# Patient Record
(demographics unavailable — no encounter records)

---

## 2025-03-27 NOTE — RAD REPORT
Procedure: Chest Single View



HISTORY: Cough                    



COMPARISON: 2021



FINDINGS:



The lungs appear clear of acute infiltrate.



No significant pleural effusion noted.



The heart is moderately enlarged.



.



IMPRESSION:



No acute abnormality is displayed.



Reported By: Quentin Carvajal 

Electronically Signed:  3/27/2025 10:19 PM

## 2025-03-27 NOTE — P.HP
Certification for Inpatient


Patient admitted to: Inpatient


With expected LOS: >2 Midnights


Practitioner: I am a practitioner with admitting privileges, knowledge of 

patient current condition, hospital course, and medical plan of care.


Services: Services provided to patient in accordance with Admission requirements

found in Title 42 Section 412.3 of the Code of Federal Regulations





Patient History


Date of Service: 03/27/25


Reason for admission: Fever


History of Present Illness: 





Patient is a 73-year-old  female with a past medical history of 

obesity, hypertension, type 2 diabetes mellitus and hyperlipidemia.  She is 

being admitted to the hospital after she presented with fever.  Patient had a 

fever of 100.4 degrees at home.  She is also experiencing generalized weakness, 

confusion manifested by poor concentration.  Workup in the ER revealed WBC of 

14,000 renal insufficiency and abnormal urinalysis.


Allergies





Penicillins Allergy (Unverified 03/28/18 16:42)


   Unknown








Physical Examination





- Physical Exam


General: Acute distress, Obese


HEENT: Atraumatic, Normocephalic


Cardiovascular: No edema, Normal pulses, Regular rate/rhythm, Normal S1 S2


Gastrointestinal: Soft and benign, Non-distended


Neurological: Normal speech





- Studies


Laboratory Data (last 24 hrs)











  03/27/25 03/27/25 03/27/25





  20:40 20:40 20:40


 


WBC   14.60 H 


 


Hgb   13.2 


 


Hct   37.6 


 


Plt Count   157 


 


PT  14.4 H  


 


INR  1.28  


 


Sodium    137


 


Potassium    2.8 L


 


BUN    19 H


 


Creatinine    1.29 H


 


Glucose    229 H


 


Magnesium    0.7 L*


 


Total Bilirubin    1.5 H


 


AST    22


 


ALT    26


 


Alkaline Phosphatase    35 L











Assessment and Plan





- Problems (Diagnosis)


(1) UTI (urinary tract infection)


Current Visit: Yes   Status: Acute   





(2) Hypertension


Current Visit: Yes   Status: Acute   





(3) Type 2 diabetes mellitus


Current Visit: Yes   Status: Acute   





(4) Hyperlipidemia


Current Visit: Yes   Status: Acute   





- Plan


Assessment


This is a 73-year-old  female who is being admitted after she presented

with fever, generalized weakness and malaise.  She has also been experiencing 

altered mental status manifested by confusion and poor concentration.  During my

evaluation in the ER, patient's mental status was back to baseline.  She is 

receiving ceftriaxone for possible UTI.





UTI


Hypomagnesemia


Hypokalemia


Acute kidney injury


Hypertension


Hyperlipidemia


Type 2 diabetes mellitus





Plan:


Will admit inpatient telemetry


Continue patient on levofloxacin due to penicillin allergy


Follow urine and blood cultures


Electrolyte repletion including potassium and magnesium


Antiemetics


Resume rest of home medications upon reconciliation





- Advance Directives


Does patient have a Living Will: No


Does patient have a Durable POA for Healthcare: No

## 2025-03-27 NOTE — EDPHYS
Physician Documentation                                                                           

 [facility]                                                                 

Name: Marie Rodríguez                                                                                

Age: 73 yrs                                                                                       

Sex: Female                                                                                       

: 1951                                                                                   

MRN: [id number]                                                                                   

Arrival Date: 2025                                                                          

Time: 19:51                                                                                       

Account#: [id number]                                                                            

Bed 19                                                                                            

Private MD:                                                                                       

ED Physician Saad Lozada                                                                      

HPI:                                                                                              

                                                                                             

20:46 This 73 yrs old  Female presents to ER via EMS with complaints of fever, fall, will 

      weak.                                                                                       

20:46 weak fever. The patient presents with confusion, trouble concentrating. Onset: The      will 

      symptoms/episode began/occurred 1 day(s) ago. Possible causes: CVA or TIA, head injury,     

      low blood sugar, sepsis, the patient has had a history of a fever. Associated signs and     

      symptoms: The patient has no apparent associated signs or symptoms. Current symptoms:       

      In the emergency department the patient's symptoms are unchanged from the initial           

      presentation, despite home interventions. The patient reports fever, that was measured      

      at 100.4 degrees Fahrenheit. Modifying factors: there are no obvious modifying factors.     

      Patient's baseline: Neuro: alert and fully oriented.                                        

                                                                                                  

Historical:                                                                                       

- Allergies:                                                                                      

19:57 PENICILLINS;                                                                            bm8 

- Home Meds:                                                                                      

19:57 Unable to obtain [Active];                                                              bm8 

- PMHx:                                                                                           

19:57 Diabetes - IDDM; Hypertension; early stage dementia (Hypertension);                     bm8 

- PSHx:                                                                                           

19:57 Unable to Obtain;                                                                       bm8 

                                                                                                  

- Immunization history:: Adult Immunizations up to date.                                          

- Infectious Disease History:: Denies.                                                            

- Social history:: Smoking status: Patient denies any tobacco usage or history of.                

  Patient/guardian denies using alcohol, street drugs.                                            

- Family history:: not pertinent.                                                                 

                                                                                                  

                                                                                                  

ROS:                                                                                              

20:46 Eyes: Negative for injury, pain, redness, and discharge, ENT: Negative for injury,      will 

      pain, and discharge, Neck: Negative for injury, pain, and swelling, Cardiovascular:         

      Negative for chest pain, palpitations, and edema, Respiratory: Negative for shortness       

      of breath, cough, wheezing, and pleuritic chest pain, Abdomen/GI: Negative for              

      abdominal pain, nausea, vomiting, diarrhea, and constipation, Back: Negative for injury     

      and pain, : Negative for injury, bleeding, discharge, and swelling, MS/Extremity:         

      Negative for injury and deformity, Skin: Negative for injury, rash, and discoloration,      

      Neuro: Negative for headache, weakness, numbness, tingling, and seizure,                    

20:46 Constitutional: Positive for body aches, chills, fatigue, fever,                            

                                                                                                  

Exam:                                                                                             

20:46 Constitutional:  This is a well developed, well nourished patient who is awake, alert,  will 

      and in no acute distress. Head/Face:  Normocephalic, atraumatic. Eyes:  Pupils equal        

      round and reactive to light, extra-ocular motions intact.  Lids and lashes normal.          

      Conjunctiva and sclera are non-icteric and not injected.  Cornea within normal limits.      

      Periorbital areas with no swelling, redness, or edema. ENT:  Nares patent. No nasal         

      discharge, no septal abnormalities noted.  Tympanic membranes are normal and external       

      auditory canals are clear.  Oropharynx with no redness, swelling, or masses, exudates,      

      or evidence of obstruction, uvula midline.  Mucous membranes moist. Neck:  Trachea          

      midline, no thyromegaly or masses palpated, and no cervical lymphadenopathy.  Supple,       

      full range of motion without nuchal rigidity, or vertebral point tenderness.  No            

      Meningismus. Chest/axilla:  Normal chest wall appearance and motion.  Nontender with no     

      deformity.  No lesions are appreciated. Cardiovascular:  Regular rate and rhythm with a     

      normal S1 and S2.  No gallops, murmurs, or rubs.  Normal PMI, no JVD.  No pulse             

      deficits. Respiratory:  Lungs have equal breath sounds bilaterally, clear to                

      auscultation and percussion.  No rales, rhonchi or wheezes noted.  No increased work of     

      breathing, no retractions or nasal flaring. Abdomen/GI:  Soft, non-tender, with normal      

      bowel sounds.  No distension or tympany.  No guarding or rebound.  No evidence of           

      tenderness throughout. Back:  No spinal tenderness.  No costovertebral tenderness.          

      Full range of motion. Skin:  Warm, dry with normal turgor.  Normal color with no            

      rashes, no lesions, and no evidence of cellulitis. MS/ Extremity:  Pulses equal, no         

      cyanosis.  Neurovascular intact.  Full, normal range of motion., bilateral aka Neuro:       

      Awake and alert, GCS 15, oriented to person, place, time, and situation.  Cranial           

      nerves II-XII grossly intact.  Motor strength 5/5 in all extremities.  Sensory grossly      

      intact.  Cerebellar exam normal.  Normal gait. Psych:  Awake, alert, with orientation       

      to person, place and time.  Behavior, mood, and affect are within normal limits.            

20:56 ECG was reviewed by the Attending Physician.                                            [facility] 

                                                                                                  

Vital Signs:                                                                                      

19:56  / 88; Pulse 99; Resp 18; Temp 100.4; Pulse Ox 97% ; Weight 86 kg; Height 5 ft. 2 bm8 

      in. ; Pain 0/10;                                                                            

22:32  / 81; Pulse 80; Resp 18; Temp 98.7; Pulse Ox 98% ; Pain 0/10;                    bm8 

23:17  / 85; Pulse 74; Resp 18; Temp 98.7; Pulse Ox 95% ; Pain 0/10;                    bm8 

19:56 Body Mass Index 34.68 (86.00 kg, 157.48 cm)                                             bm8 

19:56 Pain Scale: Adult                                                                       bm8 

22:32 Pain Scale: Adult                                                                       bm8 

23:17 Pain Scale: Adult                                                                       bm8 

                                                                                                  

[place] Coma Score:                                                                               

19:59 Eye Response: spontaneous(4). Motor Response: obeys commands(6). Verbal Response:       bm8 

      oriented(5). Total: 15.                                                                     

22:32 Eye Response: spontaneous(4). Motor Response: obeys commands(6). Verbal Response:       bm8 

      oriented(5). Total: 15.                                                                     

23:17 Eye Response: spontaneous(4). Motor Response: obeys commands(6). Verbal Response:       bm8 

      oriented(5). Total: 15.                                                                     

                                                                                                  

MDM:                                                                                              

20:19 Medical Screening Exam initiated                                                        will 

20:53 Differential diagnosis: viral Infection, bacterial infection, URI, bronchitis,          will 

      pneumonia UTI, gastroenteritis. Differential Diagnosis altered mental status, sepsis,       

      flu. Differential Diagnosis: CVA, electrolyte abnormality, hypoglycemia, intracranial       

      bleed, meningitis, pneumonia, seizure, sepsis, TIA, UTI, volume depletion. Data             

      reviewed: vital signs, nurses notes, EMS record, lab test result(s), EKG, radiologic        

      studies, CT scan, plain films. Consideration of Admission/Observation Patient was           

      admitted/placed on observation. Escalation of care including admission/observation          

      considered. I considered the following discharge prescriptions or medication management     

      in the emergency department Medications were administered in the Emergency Department.      

      See MAR. Independent interpretation of the following test(s) in the Emergency               

      Department EKG: See my EKG interpretation above. Test considered but Not performed:         

      Ultrasound no abd usg.                                                                      

                                                                                                  

                                                                                             

20:25 Order name: Basic Metabolic Panel; Complete Time: 21:49                                 [facility] 

                                                                                             

20:25 Order name: CBC with Diff; Complete Time: 21:42                                         [facility] 

                                                                                             

20:25 Order name: LFT's; Complete Time: 21:49                                                 [facility] 

                                                                                             

20:25 Order name: Magnesium; Complete Time: 21:49                                             [facility] 

                                                                                             

20:25 Order name: NT PRO-BNP; Complete Time: 21:49                                            [facility] 

                                                                                             

20:25 Order name: PT-INR; Complete Time: 21:42                                                [facility] 

                                                                                             

20:25 Order name: Troponin HS; Complete Time: 21:49                                           [facility] 

                                                                                             

20:25 Order name: Blood Culture Adult (2)                                                     [facility] 

                                                                                             

20:25 Order name: COVID-19 Ag + Flu A+B Ag; Complete Time: 21:49                              [facility] 

                                                                                             

20:25 Order name: Lactate w/ 2H reflex if indic.; Complete Time: 21:44                        [facility] 

                                                                                             

20:25 Order name: Urinalysis w/ reflexes; Complete Time: 21:42                                [facility] 

                                                                                             

20:25 Order name: Urine Culture                                                               [facility] 

                                                                                             

21:46 Order name: Ghost Lactate-NO COLLECT Timer; Complete Time: 00:35                        [facility]

                                                                                             

22:00 Order name: Creatine Phosphokinase                                                      [facility]

                                                                                             

22:00 Order name: Lactate w/ 2H reflex if indic.                                              [facility]

                                                                                             

22:00 Order name: Magnesium                                                                   [facility]

                                                                                             

22:00 Order name: NT PRO-BNP                                                                  [facility]

                                                                                             

22:00 Order name: Phosphorus                                                                  [facility]

                                                                                             

22:00 Order name: Urinalysis w/ reflexes                                                      [facility]

                                                                                             

22:00 Order name: Basic Metabolic Panel                                                       [facility]

                                                                                             

22:00 Order name: Basic Metabolic Panel                                                       [facility]

                                                                                             

22:00 Order name: CBC with Automated Diff                                                     [facility]

                                                                                             

22:00 Order name: CBC with Automated Diff                                                     [facility]

                                                                                             

06:06 Order name: Phosphorus                                                                  [facility]

                                                                                             

06:06 Order name: Creatine Phosphokinase                                                      [facility]

                                                                                             

06:06 Order name: NT PRO-BNP                                                                  [facility]

                                                                                             

06:06 Order name: Magnesium                                                                   [facility]

                                                                                             

06:44 Order name: Lactate w/ 2H reflex if indic.                                              [facility]

                                                                                             

08:44 Order name: Ghost Lactate-NO COLLECT Timer                                              [facility]

                                                                                             

10:28 Order name: Lactate Sepsis 2 HR Follow-up                                               [facility]

                                                                                             

20:25 Order name: XRAY Chest (1 view); Complete Time: 00:35                                   [facility] 

                                                                                             

20:57 Order name: Head C Spine Mpr Wo Con; Complete Time: 00:35                               [facility]

                                                                                             

21:04 Order name: Chest Abd Pelvis Wo Con; Complete Time: 00:35                               [facility]

                                                                                             

20:25 Order name: EKG; Complete Time: 20:26                                                   [facility] 

                                                                                             

22:02 Order name: Occupational Therapy Consult                                                [facility]

                                                                                             

22:02 Order name: Physical Therapy Consult                                                    [facility]

                                                                                             

20:25 Order name: Cardiac monitoring; Complete Time: 20:28                                    [facility] 

                                                                                             

20:25 Order name: EKG - Nurse/Tech; Complete Time: 20:                                      [facility] 

                                                                                             

20:25 Order name: IV Saline Lock; Complete Time: 20:28                                        [facility] 

                                                                                             

20:25 Order name: Labs collected and sent; Complete Time: 20:                               [facility] 

                                                                                             

20:25 Order name: O2 Per Protocol; Complete Time: 20:                                       [facility] 

                                                                                             

20:25 Order name: O2 Sat Monitoring; Complete Time: 20:28                                     [facility] 

                                                                                                  

EC:56 Rate is 97 beats/min. Rhythm is regular. QRS Axis is Normal. NH interval is normal. QRS will 

      interval is normal. QT interval is normal. No Q waves. T waves are Normal in leads II,      

      III, aVF. Clinical impression: NSR w/ Non-specific ST/T Changes and No evidence of          

      ischemia.                                                                                   

                                                                                                  

Administered Medications:                                                                         

21:23 Drug: NS 0.9% IV (30 ml/kg) 30 ml/kg IV at bolus once; Sepsis Protocol; to be given as  bm8 

      a bolus over 90 minutes Route: IV; Rate: bolus; Site: left forearm;                         

23:16 Follow up: Response: No adverse reaction; IV Status: Completed infusion                 bm8 

21:23 Drug: levofloxacin IVPB 500 mg 100 ml IVPB once over 60 mins Volume: 100 ml; Route:     bm8 

      IVPB; Infused Over: 60 mins; Site: left forearm;                                            

23:16 Follow up: Response: No adverse reaction; IV Status: Completed infusion                 bm8 

21:23 Drug: Acetaminophen  mg PO once Route: PO;                                        bm8 

23:16 Follow up: Response: No adverse reaction                                                bm8 

21:57 Drug: Rocephin IV 1 grams IV at per protocol once; Given slow IV push per pharmacy      bm8 

      instructions Route: IV; Rate: per protocol; Site: left forearm;                             

23:16 Follow up: Response: No adverse reaction; IV Status: Completed infusion                 bm8 

22:29 Drug: Potassium PO Effervescent Tablet 50 mEq PO once; dissolve in 4 ounces of water or bm8 

      juice Route: PO;                                                                            

23:15 Follow up: Response: No adverse reaction                                                bm8 

22:41 Drug: Magnesium Sulfate IVPB 2 grams IVPB once over 2 hrs Route: IVPB; Infused Over: 2  bm8 

      hrs; Site: left forearm;                                                                    

23:15 Follow up: Response: No adverse reaction; IV Status: Infusion continued upon admission  bm8 

22:41 Drug: Potassium Chloride IV 20 mEq IV at per protocol once; administer over 1-2 hours   bm8 

      Route: IV; Rate: per protocol; Site: left forearm;                                          

23:15 Follow up: Response: No adverse reaction; IV Status: Infusion continued upon admission  bm8 

23:14 Drug: NS 0.9% with KCl IV 20 mEq/L 1000 ml IV at 125 ml/hr continuous Route: IV; Rate:  bm8 

      125 ml/hr; Site: left forearm;                                                              

23:15 Follow up: Response: No adverse reaction; IV Status: Infusion continued upon admission  bm8 

                                                                                                  

                                                                                                  

Disposition Summary:                                                                              

25 21:48                                                                                    

Hospitalization Ordered                                                                           

 Notes:       Hospitalization Status: Inpatient Admission                                           
  will

      Provider: Prince will Long 

      Condition: Fair                                                                         will 

      Problem: new                                                                            will 

      Symptoms: have improved                                                                 will 

      Bed/Room Type: Standard                                                                 will 

      Location: Telemetry/MedSurg (Inpatient)(25 14:24)                                 ty  

      Room Assignment: 213(25 14:24)                                                    ty  

      Diagnosis                                                                                   

        - Fever, unspecified                                                                  will 

        - UTI/ Urinary tract infection, site not specified                                    will 

        - Altered mental status, unspecified                                                  will 

        - Weakness                                                                            will 

        - Fall on same level, unspecified                                                     will 

        - Elevated white blood cell count                                                     will 

        - Severe sepsis without septic shock                                                  will 

        - Hypokalemia                                                                         will 

        - Hypomagnesemia                                                                      will 

        - Unspecified kidney failure                                                          will 

      Forms:                                                                                      

        - Medication Reconciliation Form                                                      will 

        - SBAR form                                                                           will 

        - Leadership Thank You Letter                                                         will 

Signatures:                                                                                       

Dispatcher [employer]                           Saad Avendaño MD MD cha Villegas, Rebecca                            rv1                                                  

Yandell, Hardeep                               ty                                                   

Connolly, Rj, RN                      RN   [id number]                                                  

                                                                                                  

Corrections: (The following items were deleted from the chart)                                    

20:26 20:26 BASIC METABOLIC PANEL+C.LAB.BRZ ordered. EDMS                                     EDMS

20:26 20:26 CBC+H.LAB.BRZ ordered. EDMS                                                       EDMS

20:26 20:26 HEPATIC FUNCTION+C.LAB.BRZ ordered. EDMS                                          EDMS

20:26 20:26 MAGNESIUM+C.LAB.BRZ ordered. EDMS                                                 EDMS

20:26 20:26 PROBNP+C.LAB.BRZ ordered. EDMS                                                    EDMS

20:26 20:26 PROTIME (+INR)+COAG.LAB.BRZ ordered. EDMS                                         EDMS

20:26 20:26 Troponin High Sensitivity+C.LAB.BRZ ordered. EDMS                                 EDMS

20:26 20:26 BLOOD CULTURE*+BA.LAB.BRZ ordered. EDMS                                           EDMS

20:26 20:26 COVID-19 Ag + Flu A+B Ag+I.LAB.BRZ ordered. EDMS                                  EDMS

20:26 20:26 LACTATE+C.LAB.BRZ ordered. EDMS                                                   EDMS

20:26 20:26 Urinalysis+U.LAB.BRZ ordered. EDMS                                                EDMS

20:26 20:26 Urine Culture+BA.LAB.BRZ ordered. EDMS                                            EDMS

20:57 20:26 Head C Spine Cap Wo Con+CT.RAD.BRZ ordered. EDMS                                  EDMS

23:19 21:48 Telemetry/MedSurg (Inpatient) will                                                 rv1 

23:19 21:48 will                                                                               rv1 

                                                                                             

14:24  23:19 BR ER HOLD rv1                                                            ty  

                                                                                             

14:24  23:19 ERHOLD- rv1                                                                 ty  

                                                                                                  

**************************************************************************************************

## 2025-03-27 NOTE — RAD REPORT
EXAM: CT CHEST, ABDOMEN AND PELVIS WITHOUT CONTRAST



CLINICAL INDICATION: Chest and abdominal pain status post fall



TECHNIQUE: CT chest, abdomen and pelvis was performed, without IV contrast, as per department protoco
l. Axial, sagittal and coronal reconstructions were obtained. One or more of the following dose

reduction techniques were used: Automated exposure control, adjustment of the mA and/or kV according 
to the patient size, and/or iterative reconstruction. Unless otherwise specified, incidental

findings do not require dedicated imaging follow-up. 



The lack of IV and oral contrast limits evaluation of the mediastinum, vicki, vessels, organs and osito
l.



COMPARISON: None



FINDINGS:



No pulmonary contusion



No mediastinal hematoma.





No pleural effusion. No pericardial effusion.



Liver, spleen, pancreas, adrenals kidneys and bladder do not demonstrate a traumatic injury.



Small fatty lesions within the liver are benign. Prominence of the caudate and left lobes with a mild
ly nodular contour may indicate chronic hepatic disease.



Right hemicolectomy. No obstruction. Extrarenal pelves are present. Mild dilatation of the right jacqueline
l pelvis could be physiologic or indicate a UPJ stricture.



There is no evidence of diverticulitis



IMPRESSION:  



No acute traumatic injury involving the chest/abdomen/pelvis seen.



Reported By: Quentin Carvajal 

Electronically Signed:  3/27/2025 10:18 PM

## 2025-03-27 NOTE — RAD REPORT
EXAMINATION: CT HEAD WITHOUT CONTRAST 

CT CERVICAL SPINE WITHOUT CONTRAST 



CLINICAL INDICATION: Head and neck injury status post fall. Head and neck pain



TECHNIQUE: Axial CT images from the skull base to the vertex without intravenous contrast. Axial CT i
mages through the cervical spine were obtained without intravenous contrast. Sagittal and coronal

reformatted images were created from the data set. Coronal and sagittal reformatted images were creat
ed from the data set. One or more of the following dose reduction techniques were used: Automated

exposure control, adjustment of the mA and/or kV according to patient size, and/or iterative reconstr
uction. Unless otherwise specified, incidental findings do not require dedicated imaging follow-up.

[id number].



Comparison: none



FINDINGS:



An intracranial bleed is not seen.



Ventricles are normal in caliber.



No significant hypodensity within the brain



No extra-axial fluid collection.



No fluid within the sinuses/mastoids



No fracture or dislocation is seen involving the cervical spine.



IMPRESSION:



No acute intracranial abnormality noted



A cervical fracture is not seen.



If the patient continues to have symptoms to suggest acute CNS/spinal pathology then MRI would be rec
ommended



Reported By: Quentin Carvajal 

Electronically Signed:  3/27/2025 10:17 PM

## 2025-03-27 NOTE — ER
Nurse's Notes                                                                                     

 [facility]                                                                 

Name: Marie Rodríguez                                                                                

Age: 73 yrs                                                                                       

Sex: Female                                                                                       

: 1951                                                                                   

MRN: [id number]                                                                                   

Arrival Date: 2025                                                                          

Time: 19:51                                                                                       

Account#: [id number]                                                                            

Bed 19                                                                                            

Private MD:                                                                                       

Diagnosis: Fever, unspecified;UTI/ Urinary tract infection, site not specified;Altered mental     

  status, unspecified;Weakness;Fall on same level, unspecified;Elevated white blood               

  cell count;Severe sepsis without septic shock;Hypokalemia;Hypomagnesemia;Unspecified            

  kidney failure                                                                                  

                                                                                                  

Presentation:                                                                                     

                                                                                             

19:56 Chief complaint: Patient states: I fell at home and was having trouble getting up so my bm8 

       called 911 and I have a little fever. Coronavirus screen: Vaccine status:           

      Patient reports receiving the 2nd dose of the covid vaccine. At this time, the client       

      does not indicate any symptoms associated with coronavirus-19. Ebola Screen: Patient        

      negative for fever greater than or equal to 101.5 degrees Fahrenheit, and additional        

      compatible Ebola Virus Disease symptoms Patient denies exposure to infectious person.       

      Patient denies travel to an Ebola-affected area in the 21 days before illness onset. No     

      symptoms or risks identified at this time. Initial Sepsis Screen: Does the patient meet     

      any 2 criteria? Temp <36.0*C (96.8*F)) or > 38.3*C (100.9*F). HR > 90 bpm. Yes Does the     

      patient have a suspected source of infection? No. Patient's initial sepsis screen is        

      negative. Risk Assessment: Do you want to hurt yourself or someone else? Patient            

      reports no desire to harm self or others. Onset of symptoms was 2025 at 17:00.    

19:56 Method Of Arrival: EMS: [facility]                                                       bm8 

19:56 Acuity: SUSIE 3                                                                           bm8 

                                                                                                  

Triage Assessment:                                                                                

19:57 General: Appears in no apparent distress. comfortable, Behavior is calm, cooperative,   bm8 

      appropriate for age. Pain: Denies pain. EENT: No deficits noted. No signs and/or            

      symptoms were reported regarding the EENT system. Neuro: Level of Consciousness is          

      awake, alert, obeys commands, Oriented to person, place, situation, Appropriate for age     

       are equal bilaterally Moves all extremities. Full function Speech is normal,          

      Facial symmetry appears normal, Pupils are PERRLA, Pupil Size: 3mm. Cardiovascular:         

      Denies chest pain, Heart tones S1 S2 present. Respiratory: Airway is patent Respiratory     

      effort is even, unlabored, Respiratory pattern is regular, symmetrical, Breath sounds       

      are clear bilaterally. GI: No signs and/or symptoms were reported involving the             

      gastrointestinal system. : No signs and/or symptoms were reported regarding the           

      genitourinary system. Derm: No signs and/or symptoms reported regarding the                 

      dermatologic system. Musculoskeletal: No signs and/or symptoms reported regarding the       

      musculoskeletal system.                                                                     

                                                                                                  

Historical:                                                                                       

- Allergies:                                                                                      

19:57 PENICILLINS;                                                                            bm8 

- Home Meds:                                                                                      

19:57 Unable to obtain [Active];                                                              bm8 

- PMHx:                                                                                           

19:57 Diabetes - IDDM; Hypertension; early stage dementia (Hypertension);                     bm8 

- PSHx:                                                                                           

:57 Unable to Obtain;                                                                       bm8 

                                                                                                  

- Immunization history:: Adult Immunizations up to date.                                          

- Infectious Disease History:: Denies.                                                            

- Social history:: Smoking status: Patient denies any tobacco usage or history of.                

  Patient/guardian denies using alcohol, street drugs.                                            

- Family history:: not pertinent.                                                                 

                                                                                                  

                                                                                                  

Screenin:59 [facility] ED Fall Risk Assessment (Adult) History of falling in the last 3 months,       bm8 

      including since admission Yes- single mechanical fall (1 pt) Confusion or                   

      Disorientation No (0 pts) Intoxicated or Sedated No (0 pts) Impaired Gait No (0 pts)        

      Mobility Assist Device Used No (0 pt) Altered Elimination No (0 pt) Score/Fall Risk         

      Level 0 - 2 = Low Risk Oriented to surroundings, Maintained a safe environment,             

      Educated pt \T\ family on fall prevention, incl call for assistance when getting out of     

      bed, Assessed \T\ reinforced patient's understanding of fall precautions, Hourly rounding   

      (assess needs \T\ fall precautionary measures) done, Used ambulatory aids as needed         

      (educated on \T\ assisted with), Used gait belt as appropriate. Abuse screen: Denies        

      threats or abuse. Nutritional screening: No deficits noted. Tuberculosis screening: No      

      symptoms or risk factors identified.                                                        

                                                                                                  

Assessment:                                                                                       

:59 Reassessment: see triage assessment.                                                    bm8 

22:32 Reassessment: Patient appears in no apparent distress at this time. Patient and/or      bm8 

      family updated on plan of care and expected duration. Pain level reassessed. Patient is     

      alert, oriented x 3, equal unlabored respirations, skin warm/dry/pink. Patient denies       

      pain at this time. Patient states feeling better. Patient states symptoms have improved.    

23:17 Reassessment: Patient appears in no apparent distress at this time. Patient and/or      bm8 

      family updated on plan of care and expected duration. Pain level reassessed. Patient is     

      alert, oriented x 3, equal unlabored respirations, skin warm/dry/pink. Patient denies       

      pain at this time. Patient states feeling better. Patient states symptoms have improved.    

                                                                                             

01:25 Reassessment: attempted to redraw lactate from pt's current IV and no success. pt aware bm8 

      of need for repeat labs and requested that all the labs be drawn at the same time if        

      she was going to have to have another needle stick.                                         

                                                                                                  

Vital Signs:                                                                                      

                                                                                             

19:56  / 88; Pulse 99; Resp 18; Temp 100.4; Pulse Ox 97% ; Weight 86 kg; Height 5 ft. 2 bm8 

      in. ; Pain 0/10;                                                                            

22:32  / 81; Pulse 80; Resp 18; Temp 98.7; Pulse Ox 98% ; Pain 0/10;                    bm8 

23:17  / 85; Pulse 74; Resp 18; Temp 98.7; Pulse Ox 95% ; Pain 0/10;                    bm8 

19:56 Body Mass Index 34.68 (86.00 kg, 157.48 cm)                                             bm8 

19:56 Pain Scale: Adult                                                                       bm8 

22:32 Pain Scale: Adult                                                                       bm8 

23:17 Pain Scale: Adult                                                                       bm8 

                                                                                                  

Boni Coma Score:                                                                               

19:59 Eye Response: spontaneous(4). Motor Response: obeys commands(6). Verbal Response:       bm8 

      oriented(5). Total: 15.                                                                     

22:32 Eye Response: spontaneous(4). Motor Response: obeys commands(6). Verbal Response:       bm8 

      oriented(5). Total: 15.                                                                     

23:17 Eye Response: spontaneous(4). Motor Response: obeys commands(6). Verbal Response:       bm8 

      oriented(5). Total: 15.                                                                     

                                                                                                  

ED Course:                                                                                        

19:55 Patient arrived in ED.                                                                  bm8 

19:57 Triage completed.                                                                       bm8 

19:57 Arm band placed on right wrist.                                                         bm8 

19:59 Patient has correct armband on for positive identification. Bed in low position. Call   bm8 

      light in reach. Side rails up X 1. Adult w/ patient. Client placed on continuous            

      cardiac and pulse oximetry monitoring. NIBP monitoring applied. Pulse ox on. NIBP on.       

      Door closed. Noise minimized. Pillow given. Verbal reassurance given. Head of bed           

      elevated.                                                                                   

20:19 Saad Lozada MD is Attending Physician.                                             will 

20:20 First set of blood cultures drawn.                                                      bm8 

20:27 Rj Connolly, RN is Primary Nurse.                                                    bm8 

20:33 EKG done, by ED staff, reviewed by Saad Lozada MD.                                   oe  

20:40 No provider procedures requiring assistance completed. Initial lab(s) drawn, by me,     bm8 

      sent to lab. Second set of blood cultures drawn by me, COVID swab sent to lab. Flu          

      and/or RSV swab sent to lab. Inserted saline lock: in left forearm, using aseptic           

      technique. Blood collected. Flushed with 10 mL NS. Patient maintains SpO2 saturation        

      greater than 95% on room air.                                                               

21:01 XRAY Chest (1 view) In Process Unspecified.                                             EDMS

21:47 Prince Long MD is Hospitalizing Provider.                                          will 

22:01 Head C Spine Mpr Wo Con In Process Unspecified.                                         EDMS

22:01 Chest Abd Pelvis Wo Con In Process Unspecified.                                         EDMS

23:17 Provided Education on: need for admission.                                              bm8 

23:17 Patient admitted, IV remains in place.                                                  bm8 

                                                                                                  

Administered Medications:                                                                         

21:23 Drug: NS 0.9% IV (30 ml/kg) 30 ml/kg IV at bolus once; Sepsis Protocol; to be given as  bm8 

      a bolus over 90 minutes Route: IV; Rate: bolus; Site: left forearm;                         

23:16 Follow up: Response: No adverse reaction; IV Status: Completed infusion                 bm8 

21:23 Drug: levofloxacin IVPB 500 mg 100 ml IVPB once over 60 mins Volume: 100 ml; Route:     bm8 

      IVPB; Infused Over: 60 mins; Site: left forearm;                                            

23:16 Follow up: Response: No adverse reaction; IV Status: Completed infusion                 bm8 

21:23 Drug: Acetaminophen  mg PO once Route: PO;                                        bm8 

23:16 Follow up: Response: No adverse reaction                                                bm8 

21:57 Drug: Rocephin IV 1 grams IV at per protocol once; Given slow IV push per pharmacy      bm8 

      instructions Route: IV; Rate: per protocol; Site: left forearm;                             

23:16 Follow up: Response: No adverse reaction; IV Status: Completed infusion                 bm8 

22:29 Drug: Potassium PO Effervescent Tablet 50 mEq PO once; dissolve in 4 ounces of water or bm8 

      juice Route: PO;                                                                            

23:15 Follow up: Response: No adverse reaction                                                bm8 

22:41 Drug: Magnesium Sulfate IVPB 2 grams IVPB once over 2 hrs Route: IVPB; Infused Over: 2  bm8 

      hrs; Site: left forearm;                                                                    

23:15 Follow up: Response: No adverse reaction; IV Status: Infusion continued upon admission  bm8 

22:41 Drug: Potassium Chloride IV 20 mEq IV at per protocol once; administer over 1-2 hours   bm8 

      Route: IV; Rate: per protocol; Site: left forearm;                                          

23:15 Follow up: Response: No adverse reaction; IV Status: Infusion continued upon admission  bm8 

23:14 Drug: NS 0.9% with KCl IV 20 mEq/L 1000 ml IV at 125 ml/hr continuous Route: IV; Rate:  bm8 

      125 ml/hr; Site: left forearm;                                                              

23:15 Follow up: Response: No adverse reaction; IV Status: Infusion continued upon admission  bm8 

                                                                                                  

                                                                                                  

Medication:                                                                                       

19:59 VIS not applicable for this client.                                                     bm8 

                                                                                                  

Outcome:                                                                                          

21:48 Decision to Hospitalize by Provider.                                                    will 

23:17 Admitted to ER Hold.  Please see [facility] for further documentation.                    bm8 

23:17 Condition: stable                                                                           

23:17 Instructed on the need for admit, Demonstrated understanding of instructions, follow-up     

      care, medications,                                                                          

                                                                                             

15:50 Patient left the ED.                                                                    bp  

                                                                                                  

Signatures:                                                                                       

Dispatcher [employer]                           EDSaad Cooley MD MD cha Espinosa, Orlando oe Peltier, Brian, JULIO CÉSAR                      RN   Rj Bass, JULIO CÉSAR                      RN   bm8                                                  

                                                                                                  

**************************************************************************************************

## 2025-03-28 NOTE — P.PN
Subjective


Date of Service: 03/28/25


Chief Complaint: Fever





73-year-old  female who is being admitted after she presented with 

fever, generalized weakness and malaise.





mild distress


vomiting and nausea reported





Review of Systems


10-point ROS is otherwise unremarkable


Gastrointestinal: Nausea, Vomiting, Abdominal Pain





Physical Examination





- Vital Signs


Temperature: 98.7 F


Blood Pressure: 158/73


Pulse: 78


Respirations: 20


Pulse Ox (%): 98





- Physical Exam


General: Disheveled, Mild distress


HEENT: Atraumatic, Normocephalic


Respiratory: Clear to auscultation bilaterally, Normal air movement


Cardiovascular: Regular rate/rhythm, Normal S1 S2


Gastrointestinal: No rebound, Tenderness


Integumentary: No breakdown





- Studies


Laboratory Data (last 24 hrs)











  03/27/25 03/27/25 03/27/25





  20:40 20:40 20:40


 


WBC   14.60 H 


 


Hgb   13.2 


 


Hct   37.6 


 


Plt Count   157 


 


PT  14.4 H  


 


INR  1.28  


 


Sodium    137


 


Potassium    2.8 L


 


BUN    19 H


 


Creatinine    1.29 H


 


Glucose    229 H


 


Magnesium    0.7 L*


 


Total Bilirubin    1.5 H


 


AST    22


 


ALT    26


 


Alkaline Phosphatase    35 L











Assessment And Plan





- Current Problems (Diagnosis)


(1) Hyperlipidemia


Current Visit: Yes   Status: Acute   





(2) Hypertension


Current Visit: Yes   Status: Acute   





(3) Type 2 diabetes mellitus


Current Visit: Yes   Status: Acute   





(4) UTI (urinary tract infection)


Current Visit: Yes   Status: Acute   





- Plan








UTI


Hypomagnesemia


Hypokalemia


Acute kidney injury


Hypertension


Hyperlipidemia


Type 2 diabetes mellitus





Plan:


IVF


Continue patient on levofloxacin due to penicillin allergy, monitor for 

tolerance


Follow urine and blood cultures


Electrolyte repletion including potassium and magnesium


Antiemetics


Resume rest of home medications upon reconciliation

## 2025-03-29 NOTE — P.PN
Date of Service: 03/29/25





Subjective:


Symptoms are improving


Low-grade fevers overnight although patient feels better


No acute events overnight


Does report some low back pain-seems musculoskeletal





ROS: 


10 point ROS as noted above, otherwise negative








Physical exam


GEN: Alert, oriented, NAD


HEENT: Normal conjunctiva, sclera anicteric


CV: Regular rate and rhythm, no edema


Pulm: Nonlabored respirations on room air


ABD: Soft, nontender, nondistended


MSK: No joint tenderness, no CVA tenderness


Integumentary: No rashes


Neuro: Normal speech, normal affect





Vitals reviewed





Assessment:


UTI


Acute kidney injury


Hypomagnesemia


Hypokalemia


Hypertension


Hyperlipidemia


Diabetes mellitus type 2











Plan:


UTI


Blood cell count improving with levofloxacin


Blood cultures with no growth in 24 hours


Await urine culture





Acute kidney injury


Hypomagnesemia


Hypokalemia


Improving, electrolyte protocols in place





Hypertension


Hyperlipidemia


Diabetes mellitus type 2


ACHS Accu-Chek, sliding scale insulin


Continue home medications








DVT PPX: Lovenox


Code status: Full





Time Spent Managing Pts Care (In Minutes): 35

## 2025-03-30 NOTE — P.PN
Date of Service: 03/30/25





Subjective:


Symptoms are improving


Low-grade fevers overnight although patient feels better


No acute events overnight


Does report some low back pain-seems musculoskeletal





ROS: 


10 point ROS as noted above, otherwise negative








Physical exam


GEN: Alert, oriented, NAD


HEENT: Normal conjunctiva, sclera anicteric


CV: Regular rate and rhythm, no edema


Pulm: Nonlabored respirations on room air


ABD: Soft, nontender, nondistended


MSK: No joint tenderness, no CVA tenderness


Integumentary: No rashes


Neuro: Normal speech, normal affect





Vitals reviewed





Assessment:


UTI


Acute kidney injury


Hypomagnesemia


Hypokalemia


Hypertension


Hyperlipidemia


Diabetes mellitus type 2











Plan:


UTI


White Blood cell count improving with levofloxacin


Blood cultures with no growth in 24 hours


Urine culture with essentially pansensitive Klebsiella


Still with low grade fevers


Will continue IV abx one more day





Acute kidney injury


Hypomagnesemia


Hypokalemia


Improving, electrolyte protocols in place





Hypertension


Hyperlipidemia


Diabetes mellitus type 2


ACHS Accu-Chek, sliding scale insulin


Continue home medications








DVT PPX: Lovenox


Code status: Full





Time Spent Managing Pts Care (In Minutes): 35

## 2025-03-30 NOTE — P.PN
This is an attestation to NP note.  





Subjective: No chest pain or shortness of breath.  No nausea or vomiting.  No 

abdominal pain but c/o righ flank pain.  No obvious bleeding.  Looks comfortable

in the bed. c/o mild headache.





Objective:


General appearance: Alert and comfortable


CVS: Normal S1 and S2


Lungs: Clear to auscultation bilaterally


Abdomen: Soft, bowel sounds present, no tenderness in the abdomen, mild right 

cva tenderness


Extremities: No lower extremity edema





73-year-old patient with UTI, continue antibiotics,  cultures reviewed, WBC 

improving, still low-grade temps and mild headache, follow-up on blood cultures,

will treat as a pyelonephritis.  CT chest abdomen pelvis, CT head negative.  

Hypokalemia, replaced.  Hypertension, resume home medications.  Plan discussed 

with the patient and answered all ?'s. DC home with home health tomorrow if no 

fever, symptoms continues to improve.

## 2025-03-31 NOTE — EKG
Test Date:    2025-03-27               Test Time:    20:24:49

Technician:   TRIP                                    

                                                     

MEASUREMENT RESULTS:                                       

Intervals:                                           

Rate:         97                                     

LA:           150                                    

QRSD:         86                                     

QT:           358                                    

QTc:          454                                    

Axis:                                                

P:            63                                     

LA:           150                                    

QRS:          62                                     

T:            13                                     

                                                     

INTERPRETIVE STATEMENTS:                                       

                                                     

Normal sinus rhythm

Cannot rule out Anterior infarct, age undetermined

ST & T wave abnormality, consider inferior ischemia

Abnormal ECG

No previous ECG available for comparison



Electronically Signed On 03-31-25 11:23:24 CDT by Portillo Chapa